# Patient Record
Sex: FEMALE | Race: WHITE | HISPANIC OR LATINO | Employment: FULL TIME | URBAN - METROPOLITAN AREA
[De-identification: names, ages, dates, MRNs, and addresses within clinical notes are randomized per-mention and may not be internally consistent; named-entity substitution may affect disease eponyms.]

---

## 2021-11-07 ENCOUNTER — OFFICE VISIT (OUTPATIENT)
Dept: URGENT CARE | Facility: URGENT CARE | Age: 41
End: 2021-11-07

## 2021-11-07 VITALS
DIASTOLIC BLOOD PRESSURE: 87 MMHG | TEMPERATURE: 98.2 F | SYSTOLIC BLOOD PRESSURE: 139 MMHG | OXYGEN SATURATION: 99 % | HEART RATE: 80 BPM

## 2021-11-07 DIAGNOSIS — R05.9 COUGH: ICD-10-CM

## 2021-11-07 DIAGNOSIS — Z20.822 EXPOSURE TO 2019 NOVEL CORONAVIRUS: ICD-10-CM

## 2021-11-07 DIAGNOSIS — R43.2 LOSS OF TASTE: Primary | ICD-10-CM

## 2021-11-07 PROCEDURE — 99203 OFFICE O/P NEW LOW 30 MIN: CPT | Performed by: PREVENTIVE MEDICINE

## 2021-11-07 PROCEDURE — U0005 INFEC AGEN DETEC AMPLI PROBE: HCPCS | Performed by: PREVENTIVE MEDICINE

## 2021-11-07 PROCEDURE — U0003 INFECTIOUS AGENT DETECTION BY NUCLEIC ACID (DNA OR RNA); SEVERE ACUTE RESPIRATORY SYNDROME CORONAVIRUS 2 (SARS-COV-2) (CORONAVIRUS DISEASE [COVID-19]), AMPLIFIED PROBE TECHNIQUE, MAKING USE OF HIGH THROUGHPUT TECHNOLOGIES AS DESCRIBED BY CMS-2020-01-R: HCPCS | Performed by: PREVENTIVE MEDICINE

## 2021-11-07 NOTE — PATIENT INSTRUCTIONS
Tylenol 500 mg three times per day  Follow up for evaluation if you develop shortness of breath, oxygen level <90% or symptoms don't improve over the next week.  I recommend getting a pulse oximeter at a pharmacy to check your oxygen level.  COVID result on my chart in 2-3 days.

## 2021-11-07 NOTE — LETTER
[facility] [place]  [address] [facility] [address]  Phone: [phone]    November 7, 2021        [place]  [address] [address]          To whom it may concern:    RE: Sarah Duque    Patient was seen and treated today at our clinic.  She had a positive rapid covid  test at home.  She is advised to be off work until 11/15/21 due to this illness.  We are confirming a positive covid test here in the clinic today.      Please contact me for questions or concerns.      Sincerely,        Tristan Villa MD

## 2021-11-07 NOTE — LETTER
[facility] [place]  [address] [facility] [address]  Phone: [phone]    November 7, 2021        [facility] Duque  [address] [address]          To whom it may concern:    RE: Sarah Duque    Patient was seen and treated today at our clinic.  She has covid and is advised to be off work until at least 11/15/21.  She should be feeling better and without fever when she returns to work.    Please contact me for questions or concerns.      Sincerely,        Tristan Villa MD

## 2021-11-07 NOTE — LETTER
November 9, 2021      Sarah BARRETT [place]  [address]   [facility] [address]          SARS CoV2 PCR (no units)   Date Value   11/07/2021 Positive (A)       This letter provides a written record that you were tested for COVID-19. Your result was positive. This means you have COVID-19 (coronavirus).    How can I protect others?If you have symptoms, stay home and away from others (self-isolate) until:You ve had no fever--and no medicine that reduces fever--for 1 full day (24 hours). And      Your other symptoms have gotten better. For example, your cough or breathing has improved. And     At least 10 days have passed since your symptoms started. (If you've been told by a doctor that you have a weak immune system, wait 20 days).    If you don t have symptoms: Stay home and away from others (self-isolate) until at least 10 days have passed since your first positive COVID-19 test. If you have a weak immune system, please self-isolate for 20 days.    During this time:    Stay in your own room, including for meals. Use your own bathroom if you can.    Stay away from others in your home. No hugging, kissing or shaking hands. No visitors.     Don t go to work, school or anywhere else.     Clean  high touch  surfaces often (doorknobs, counters, handles, etc.). Use a household cleaning spray or wipes. You ll find a full list on the EPA website at www.epa.gov/pesticide-registration/list-n-disinfectants-use-against-sars-cov-2.     Cover your mouth and nose with a mask or other face covering to avoid spreading germs.    Wash your hands and face often with soap and water.    Make a list of people you have been in close contact with recently, even if either of you wore a face covering.  o Start your list from 2 days before you became ill or had a positive test.  o Include anyone that was within 6 feet of you for a cumulative total of 15 minutes or more in 24 hours. (Example: if you sat next to Keo for 5 minutes in the morning and  10 minutes in the afternoon, then you were in close contact for 15 minutes total that day. Keo would be added to your list.)        A public health worker will call or text you. It is important that you answer. They will ask you questions about possible exposures to COVID-19, such as people you have been in direct contact with and places you have visited.    Tell the people on your list that you have COVID-19; they should stay away from others for 14 days starting form the last time they were in contact with you (unless you are told something different from a public health worker).    Caregivers in these groups are at risk for severe illness due to COVID-19:  o People 65 years and older  o People who live in a nursing home or long-term care facility  o People with chronic disease (lung, heart, cancer, diabetes, kidney, liver, immunologic)  o People who have a weakened immune system, including those who:  - Are in cancer treatment  - Take medicine that weakens the immune system, such as corticosteroids  - Had a bone marrow or organ transplant  - Have an immune deficiency  - Have poorly controlled HIV or AIDS  - Are obese (body mass index of 40 or higher)  - Smoke regularly    Caregivers should wear gloves while washing dishes, handling laundry and cleaning bedrooms and bathrooms.    Wash and dry laundry with special caution. Don t shake dirty laundry, and use the warmest water setting you can.    If you have a weakened immune system, ask your doctor about other actions you should take.    For more tips, go to www.cdc.gov/coronavirus/2019-ncov/downloads/10Things.pdf.    You should not go back to work until you meet the guidelines above for ending your home isolation. You don't need to be retested for COVID-19 before going back to work- studies show that you won't spread the virus if it's been at least 10 days since your symptoms started (or 20 days, if you have a weak immune system).    Employers: This document  serves as formal notice of your employee s medical guidelines for going back to work. They must meet the above guidelines before going back to work in person.    How can I take care of myself?    1. Get lots of rest. Drink extra fluids (unless a doctor has told you not to).    2. Take Tylenol (acetaminophen) for fever or pain. If you have liver or kidney problems, ask your family doctor if it s okay to take Tylenol.     Take either:     650 mg (two 325 mg pills) every 4 to 6 hours, or     1,000 mg (two 500 mg pills) every 8 hours as needed.     Note: Don t take more than 3,000 mg in one day. Acetaminophen is found in many medicines (both prescribed and over-the-counter medicines). Read all labels to be sure you don t take too much.    For children, check the Tylenol bottle for the right dose (based on their age or weight).    3. If you have other health problems (like cancer, heart failure, an organ transplant or severe kidney disease): Call your specialty clinic if you don t feel better in the next 2 days.    4. Know when to call 911: Emergency warning signs include:    Trouble breathing or shortness of breath    Pain or pressure in the chest that doesn t go away    Feeling confused like you haven t felt before, or not being able to wake up    Bluish-colored lips or face    5. Sign up for [employer]. We know it s scary to hear that you have COVID-19. We want to track your symptoms to make sure you re okay over the next 2 weeks. Please look for an email from [employer]--this is a free, online program that we ll use to keep in touch. To sign up, follow the link in the email. Learn more at [email or web address].      Where can I get more information?     [facility] [place]: [email or web address]    Coronavirus Basics: www.health.[facility].[place]/diseases/coronavirus/basics.html    What to Do If You re Sick: www.cdc.gov/coronavirus/2019-ncov/about/steps-when-sick.html    Ending Home Isolation:  www.cdc.gov/coronavirus/2019-ncov/hcp/disposition-in-home-patients.html     Caring for Someone with COVID-19: www.cdc.gov/coronavirus/2019-ncov/if-you-are-sick/care-for-someone.html     [facility] clinical trials (COVID-19 research studies): clinicalaffairs.[facility]-clinical-trials

## 2021-11-07 NOTE — PROGRESS NOTES
Assessment & Plan     1. Loss of taste  - Symptomatic COVID-19 Virus (Coronavirus) by PCR Nose    2. Cough    3. Exposure to 2019 novel coronavirus    Tylenol 500 mg three times per day  Follow up for evaluation if you develop shortness of breath, oxygen level <90% or symptoms don't improve over the next week.  I recommend getting a pulse oximeter at a pharmacy to check your oxygen level.  COVID result on my chart in 2-3 days.       31 minutes spent on the date of the encounter doing chart review, patient visit and documentation         No follow-ups on file.    Tristan Villa MD  [facility]    Subjective     Sarah Duque is a 40 year old year old female who presents to clinic today for the following health issues:    Patient presents with:  Nasal Congestion: nasal congestion and cough for the past few days. loss of taste this morning--needing work note    This is a 39 yo female with nasal congestion and cough (dry) for 2 days.  Loss of taste this morning.  No cp or sob.  Vaccinated for covid.  Sudafed, dayquil, nyquil, tylenol, ibuprofen used for symptoms.      Patient Active Problem List   Diagnosis     Endometriosis     Encounter for insertion or removal of intrauterine contraceptive device     CARDIOVASCULAR SCREENING; LDL GOAL LESS THAN 160     Contraception       Current Outpatient Medications   Medication     albuterol (PROAIR HFA) 108 (90 BASE) MCG/ACT inhaler     No current facility-administered medications for this visit.       Past Medical History:   Diagnosis Date     Endometriosis of other specified sites 01/04    ovarian endometriomata     Generalized osteoarthrosis, unspecified site        Social History   reports that she has never smoked. She has never used smokeless tobacco. She reports that she does not drink alcohol and does not use drugs.    Family History   Problem Relation Age of Onset     Gastrointestinal Disease Mother         ulcer disease     Arthritis  Maternal Grandmother      Cerebrovascular Disease Maternal Grandmother        Review of Systems  Constitutional, HEENT, cardiovascular, pulmonary, GI, , musculoskeletal, neuro, skin, endocrine and psych systems are negative, except as otherwise noted.      Objective    /87   Pulse 80   Temp 98.2  F (36.8  C) (Tympanic)   SpO2 99%   Physical Exam   GENERAL: healthy, alert and no distress  EYES: Eyes grossly normal to inspection, PERRL and conjunctivae and sclerae normal  HENT: ear canals and TM's normal, nose and mouth without ulcers or lesions  NECK: no adenopathy, no asymmetry, masses, or scars and thyroid normal to palpation  RESP: lungs clear to auscultation - no rales, rhonchi or wheezes  CV: regular rate and rhythm, normal S1 S2, no S3 or S4, no murmur, click or rub, no peripheral edema and peripheral pulses strong  ABDOMEN: soft, nontender, no hepatosplenomegaly, no masses and bowel sounds normal  MS: no gross musculoskeletal defects noted, no edema  SKIN: no suspicious lesions or rashes  NEURO: Normal strength and tone, mentation intact and speech normal  PSYCH: mentation appears normal, affect normal/bright

## 2021-11-08 ENCOUNTER — TELEPHONE (OUTPATIENT)
Dept: URGENT CARE | Facility: URGENT CARE | Age: 41
End: 2021-11-08

## 2021-11-08 LAB — SARS-COV-2 RNA RESP QL NAA+PROBE: POSITIVE

## 2021-11-08 NOTE — TELEPHONE ENCOUNTER
"-Coronavirus (COVID-19) Notification    Caller Name (Patient, parent, daughter/son, grandparent, etc)  Patient     Reason for call  Notify of Positive Coronavirus (COVID-19) lab results, assess symptoms,  review  [employer] recommendations    Lab Result    Lab test:  2019-nCoV rRt-PCR or SARS-CoV-2 PCR    Oropharyngeal AND/OR nasopharyngeal swabs is POSITIVE for 2019-nCoV RNA/SARS-COV-2 PCR (COVID-19 virus)    RN Recommendations/Instructions per [facility] Coronavirus COVID-19 recommendations    Brief introduction script  Introduce self then review script:  \"I am calling on behalf of [employer].  We were notified that your Coronavirus test (COVID-19) for was POSITIVE for the virus.  I have some information to relay to you but first I wanted to mention that the [place] Dept of Health will be contacting you shortly [it's possible MD already called Patient] to talk to you more about how you are feeling and other people you have had contact with who might now also have the virus.  Also,  [employer] is Partnering with the [facility] for Covid-19 research, you may be contacted directly by research staff.\"    Assessment (Inquire about Patient's current symptoms)   Assessment   Current Symptoms at time of phone call: (if no symptoms, document No symptoms] Nasal congestion and loss of taste and smell   Symptoms onset (if applicable) 1 day ago     If at time of call, Patients symptoms hare worsened, the Patient should contact 911 or have someone drive them to Emergency Dept promptly:      If Patient calling 911, inform 911 personal that you have tested positive for the Coronavirus (COVID-19).  Place mask on and await 911 to arrive.    If Emergency Dept, If possible, please have another adult drive you to the Emergency Dept but you need to wear mask when in contact with other people.      Monoclonal Antibody Administration    You may be eligible to receive a new treatment with a monoclonal antibody " "for preventing hospitalization in patients at high risk for complications from COVID-19.   This medication is still experimental and available on a limited basis; it is given through an IV and must be given at an infusion center. Please note that not all people who are eligible will receive the medication since it is in limited supply.     Are you interested in being considered for this medication?  No.   Does the patient fit the criteria: No    If patient qualifies based on above criteria:  \"You will be contacted if you are selected to receive this treatment in the next 1-2 business days.   This is time sensitive and if you are not selected in the next 1-2 business days, you will not receive the medication.  If you do not receive a call to schedule, you have not been selected.\"      Review information with Patient    Your result was positive. This means you have COVID-19 (coronavirus).  We have sent you a letter that reviews the information that I'll be reviewing with you now.    How can I protect others?    If you have symptoms: stay home and away from others (self-isolate) until:    You've had no fever--and no medicine that reduces fever--for 1 full day (24 hours). And       Your other symptoms have gotten better. For example, your cough or breathing has improved. And     At least 10 days have passed since your symptoms started. (If you've been told by a doctor that you have a weak immune system, wait 20 days.)     If you don't have symptoms: Stay home and away from others (self-isolate) until at least 10 days have passed since your first positive COVID-19 test. (Date test collected)    During this time:    Stay in your own room, including for meals. Use your own bathroom if you can.    Stay away from others in your home. No hugging, kissing or shaking hands. No visitors.     Don't go to work, school or anywhere else.     Clean  high touch  surfaces often (doorknobs, counters, handles, etc.). Use a household " cleaning spray or wipes. You'll find a full list on the EPA website at www.epa.gov/pesticide-registration/list-n-disinfectants-use-against-sars-cov-2.     Cover your mouth and nose with a mask, tissue or other face covering to avoid spreading germs.    Wash your hands and face often with soap and water.    Make a list of people you have been in close contact with recently, even if either of you wore a face covering.   ; Start your list from 2 days before you became ill or had a positive test.  ; Include anyone that was within 6 feet of you for a cumulative total of 15 minutes or more in 24 hours. (Example: if you sat next to Keo for 5 minutes in the morning and 10 minutes in the afternoon, then you were in close contact for 15 minutes total that day. Keo would be added to your list.)    A public health worker will call or text you. It is important that you answer. They will ask you questions about possible exposures to COVID-19, such as people you have been in direct contact with and places you have visited.    Tell the people on your list that you have COVID-19; they should stay away from others for 14 days starting from the last time they were in contact with you (unless you are told something different from a public health worker).     Caregivers in these groups are at risk for severe illness due to COVID-19:  o People 65 years and older  o People who live in a nursing home or long-term care facility  o People with chronic disease (lung, heart, cancer, diabetes, kidney, liver, immunologic)  o People who have a weakened immune system, including those who:  - Are in cancer treatment  - Take medicine that weakens the immune system, such as corticosteroids  - Had a bone marrow or organ transplant  - Have an immune deficiency  - Have poorly controlled HIV or AIDS  - Are obese (body mass index of 40 or higher)  - Smoke regularly    Caregivers should wear gloves while washing dishes, handling laundry and cleaning  bedrooms and bathrooms.    Wash and dry laundry with special caution. Don't shake dirty laundry, and use the warmest water setting you can.    If you have a weakened immune system, ask your doctor about other actions you should take.    For more tips, go to www.cdc.gov/coronavirus/2019-ncov/downloads/10Things.pdf.    You should not go back to work until you meet the guidelines above for ending your home isolation. You don't need to be retested for COVID-19 before going back to work--studies show that you won't spread the virus if it's been at least 10 days since your symptoms started (or 20 days, if you have a weak immune system).    Employers: This document serves as formal notice of your employee's medical guidelines for going back to work. They must meet the above guidelines before going back to work in person.    How can I take care of myself?    1. Get lots of rest. Drink extra fluids (unless a doctor has told you not to).    2. Take Tylenol (acetaminophen) for fever or pain. If you have liver or kidney problems, ask your family doctor if it's okay to take Tylenol.     Take either:     650 mg (two 325 mg pills) every 4 to 6 hours, or     1,000 mg (two 500 mg pills) every 8 hours as needed.     Note: Don't take more than 3,000 mg in one day. Acetaminophen is found in many medicines (both prescribed and over-the-counter medicines). Read all labels to be sure you don't take too much.    For children, check the Tylenol bottle for the right dose (based on their age or weight).    3. If you have other health problems (like cancer, heart failure, an organ transplant or severe kidney disease): Call your specialty clinic if you don't feel better in the next 2 days.    4. Know when to call 911: Emergency warning signs include:    Trouble breathing or shortness of breath    Pain or pressure in the chest that doesn't go away    Feeling confused like you haven't felt before, or not being able to wake up    Bluish-colored  lips or face    5. Sign up for [employer]. We know it's scary to hear that you have COVID-19. We want to track your symptoms to make sure you're okay over the next 2 weeks. Please look for an email from [employer]--this is a free, online program that we'll use to keep in touch. To sign up, follow the link in the email. Learn more at [email or web address].    Where can I get more information?    [facility]: [email or web address]    Coronavirus Basics: www.health.[facility].mn./diseases/coronavirus/basics.html    What to Do If You're Sick: www.cdc.gov/coronavirus/2019-ncov/about/steps-when-sick.html    Ending Home Isolation: www.cdc.gov/coronavirus/2019-ncov/hcp/disposition-in-home-patients.html     Caring for Someone with COVID-19: www.cdc.gov/coronavirus/2019-ncov/if-you-are-sick/care-for-someone.html     [facility] clinical trials (COVID-19 research studies): clinicalaffairs.[facility]-clinical-trials     A Positive COVID-19 letter will be sent via [employer] or the mail. (Exception, no letters sent to Presurgerical/Preprocedure Patients)    Mariann Gupta LPN

## 2024-06-03 ENCOUNTER — OFFICE VISIT (OUTPATIENT)
Dept: URGENT CARE | Facility: URGENT CARE | Age: 44
End: 2024-06-03

## 2024-06-03 VITALS
DIASTOLIC BLOOD PRESSURE: 87 MMHG | SYSTOLIC BLOOD PRESSURE: 155 MMHG | BODY MASS INDEX: 31.29 KG/M2 | OXYGEN SATURATION: 99 % | TEMPERATURE: 97.4 F | RESPIRATION RATE: 16 BRPM | WEIGHT: 199.8 LBS | HEART RATE: 78 BPM

## 2024-06-03 DIAGNOSIS — J40 BRONCHITIS: Primary | ICD-10-CM

## 2024-06-03 PROCEDURE — 99213 OFFICE O/P EST LOW 20 MIN: CPT | Performed by: FAMILY MEDICINE

## 2024-06-03 RX ORDER — PREDNISONE 20 MG/1
TABLET ORAL
Qty: 12 TABLET | Refills: 0 | Status: SHIPPED | OUTPATIENT
Start: 2024-06-03 | End: 2024-06-14

## 2024-06-03 RX ORDER — AZITHROMYCIN 250 MG/1
TABLET, FILM COATED ORAL
Qty: 6 TABLET | Refills: 0 | Status: SHIPPED | OUTPATIENT
Start: 2024-06-03 | End: 2024-06-08

## 2024-06-03 NOTE — PROGRESS NOTES
(J40) Bronchitis  (primary encounter diagnosis)  Comment:   Plan: azithromycin (ZITHROMAX) 250 MG tablet,         predniSONE (DELTASONE) 20 MG tablet            CHIEF COMPLAINT    Persistent cough.      HISTORY    This patient is a 43-year-old woman who has had a persistent cough with minimal sputum production.  Duration has been about 4 weeks.  She has not noticed fever.    She has no history of smoking or vaping.  No history of asthma.      REVIEW OF SYSTEMS    No sore throat or ear pain.  No chest pain.  No abdominal pain or nausea.      EXAM  BP (!) 155/87   Pulse 78   Temp 97.4  F (36.3  C) (Oral)   Resp 16   Wt 90.6 kg (199 lb 12.8 oz)   LMP 10/17/2011   SpO2 99%   BMI 31.29 kg/m      TMs normal.  Pharynx without inflammation or swelling.  No significant cervical adenopathy.  Lungs are clear.    Left leg is about 2 cm greater in circumference than right.  Has had no calf tenderness.  Has noticed the difference in size for about a month.  She does have some problems with her knees, possible DJD.  I did suggest a primary care appointment at which time this can be checked out further.

## 2024-06-14 ENCOUNTER — OFFICE VISIT (OUTPATIENT)
Dept: FAMILY MEDICINE | Facility: CLINIC | Age: 44
End: 2024-06-14

## 2024-06-14 ENCOUNTER — ANCILLARY PROCEDURE (OUTPATIENT)
Dept: GENERAL RADIOLOGY | Facility: CLINIC | Age: 44
End: 2024-06-14
Attending: PHYSICIAN ASSISTANT

## 2024-06-14 ENCOUNTER — TELEPHONE (OUTPATIENT)
Dept: FAMILY MEDICINE | Facility: CLINIC | Age: 44
End: 2024-06-14

## 2024-06-14 VITALS
HEART RATE: 83 BPM | OXYGEN SATURATION: 98 % | WEIGHT: 199 LBS | DIASTOLIC BLOOD PRESSURE: 88 MMHG | HEIGHT: 67 IN | TEMPERATURE: 98 F | RESPIRATION RATE: 16 BRPM | SYSTOLIC BLOOD PRESSURE: 128 MMHG | BODY MASS INDEX: 31.23 KG/M2

## 2024-06-14 DIAGNOSIS — M25.562 CHRONIC PAIN OF BOTH KNEES: ICD-10-CM

## 2024-06-14 DIAGNOSIS — R05.2 SUBACUTE COUGH: ICD-10-CM

## 2024-06-14 DIAGNOSIS — M79.89 LEFT LEG SWELLING: Primary | ICD-10-CM

## 2024-06-14 DIAGNOSIS — G89.29 CHRONIC PAIN OF BOTH KNEES: ICD-10-CM

## 2024-06-14 DIAGNOSIS — R06.02 SOB (SHORTNESS OF BREATH): ICD-10-CM

## 2024-06-14 DIAGNOSIS — M25.561 CHRONIC PAIN OF BOTH KNEES: ICD-10-CM

## 2024-06-14 LAB
ALBUMIN SERPL BCG-MCNC: 4.3 G/DL (ref 3.5–5.2)
ALP SERPL-CCNC: 102 U/L (ref 40–150)
ALT SERPL W P-5'-P-CCNC: 13 U/L (ref 0–50)
ANION GAP SERPL CALCULATED.3IONS-SCNC: 6 MMOL/L (ref 7–15)
AST SERPL W P-5'-P-CCNC: 17 U/L (ref 0–45)
BILIRUB SERPL-MCNC: 0.2 MG/DL
BUN SERPL-MCNC: 18.3 MG/DL (ref 6–20)
CALCIUM SERPL-MCNC: 10 MG/DL (ref 8.6–10)
CHLORIDE SERPL-SCNC: 105 MMOL/L (ref 98–107)
CREAT SERPL-MCNC: 0.72 MG/DL (ref 0.51–0.95)
D DIMER PPP FEU-MCNC: <0.27 UG/ML FEU (ref 0–0.5)
DEPRECATED HCO3 PLAS-SCNC: 29 MMOL/L (ref 22–29)
EGFRCR SERPLBLD CKD-EPI 2021: >90 ML/MIN/1.73M2
GLUCOSE SERPL-MCNC: 99 MG/DL (ref 70–99)
NT-PROBNP SERPL-MCNC: <36 PG/ML (ref 0–450)
POTASSIUM SERPL-SCNC: 4.6 MMOL/L (ref 3.4–5.3)
PROT SERPL-MCNC: 7.4 G/DL (ref 6.4–8.3)
SODIUM SERPL-SCNC: 140 MMOL/L (ref 135–145)

## 2024-06-14 PROCEDURE — 71046 X-RAY EXAM CHEST 2 VIEWS: CPT | Mod: TC | Performed by: RADIOLOGY

## 2024-06-14 PROCEDURE — 36415 COLL VENOUS BLD VENIPUNCTURE: CPT | Performed by: PHYSICIAN ASSISTANT

## 2024-06-14 PROCEDURE — 80053 COMPREHEN METABOLIC PANEL: CPT | Performed by: PHYSICIAN ASSISTANT

## 2024-06-14 PROCEDURE — 99204 OFFICE O/P NEW MOD 45 MIN: CPT | Performed by: PHYSICIAN ASSISTANT

## 2024-06-14 PROCEDURE — 83880 ASSAY OF NATRIURETIC PEPTIDE: CPT | Performed by: PHYSICIAN ASSISTANT

## 2024-06-14 PROCEDURE — 73562 X-RAY EXAM OF KNEE 3: CPT | Mod: TC | Performed by: RADIOLOGY

## 2024-06-14 PROCEDURE — 85379 FIBRIN DEGRADATION QUANT: CPT | Performed by: PHYSICIAN ASSISTANT

## 2024-06-14 RX ORDER — CITALOPRAM HYDROBROMIDE 20 MG/1
1 TABLET ORAL DAILY
COMMUNITY
Start: 2024-01-30 | End: 2024-06-14

## 2024-06-14 RX ORDER — PROGESTERONE 100 MG/1
100 CAPSULE ORAL DAILY
COMMUNITY
Start: 2024-01-30 | End: 2024-06-14

## 2024-06-14 ASSESSMENT — PAIN SCALES - GENERAL: PAINLEVEL: MODERATE PAIN (4)
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
## 2024-06-14 NOTE — TELEPHONE ENCOUNTER
Called patient and explained we had orders for xrays and to return to clinic. Felipa Mann CMA on 6/14/2024 at 1:58 PM

## 2024-06-15 ENCOUNTER — TELEPHONE (OUTPATIENT)
Dept: FAMILY MEDICINE | Facility: CLINIC | Age: 44
End: 2024-06-15

## 2024-06-16 RX ORDER — BENZONATATE 200 MG/1
200 CAPSULE ORAL 3 TIMES DAILY PRN
Qty: 15 CAPSULE | Refills: 0 | Status: SHIPPED | OUTPATIENT
Start: 2024-06-16

## 2024-06-17 NOTE — TELEPHONE ENCOUNTER
I called patient. Her voicemail was identified by her. I left a detailed message regarding results. I apologized for her not getting a call as results were not available before clinic closed on Friday. It appears that she has seen the two [employer] messages I left for her. I reiterated the messages left in [employer] via her voicemail.

## 2024-08-24 ENCOUNTER — HEALTH MAINTENANCE LETTER (OUTPATIENT)
Age: 44
End: 2024-08-24

## 2025-01-08 ENCOUNTER — OFFICE VISIT (OUTPATIENT)
Dept: FAMILY MEDICINE | Facility: CLINIC | Age: 45
End: 2025-01-08

## 2025-01-08 ENCOUNTER — ANCILLARY PROCEDURE (OUTPATIENT)
Dept: GENERAL RADIOLOGY | Facility: CLINIC | Age: 45
End: 2025-01-08
Attending: PHYSICIAN ASSISTANT

## 2025-01-08 VITALS
BODY MASS INDEX: 31.79 KG/M2 | OXYGEN SATURATION: 98 % | DIASTOLIC BLOOD PRESSURE: 82 MMHG | SYSTOLIC BLOOD PRESSURE: 134 MMHG | WEIGHT: 197.8 LBS | RESPIRATION RATE: 16 BRPM | TEMPERATURE: 97.6 F | HEART RATE: 101 BPM | HEIGHT: 66 IN

## 2025-01-08 DIAGNOSIS — M54.16 LUMBAR RADICULOPATHY: ICD-10-CM

## 2025-01-08 DIAGNOSIS — M54.16 LUMBAR RADICULOPATHY: Primary | ICD-10-CM

## 2025-01-08 PROCEDURE — 72100 X-RAY EXAM L-S SPINE 2/3 VWS: CPT | Mod: TC | Performed by: RADIOLOGY

## 2025-01-08 PROCEDURE — 99213 OFFICE O/P EST LOW 20 MIN: CPT | Performed by: PHYSICIAN ASSISTANT

## 2025-01-08 RX ORDER — METHYLPREDNISOLONE 4 MG/1
4 TABLET ORAL 2 TIMES DAILY
COMMUNITY
Start: 2025-01-03

## 2025-01-08 ASSESSMENT — PATIENT HEALTH QUESTIONNAIRE - PHQ9
10. IF YOU CHECKED OFF ANY PROBLEMS, HOW DIFFICULT HAVE THESE PROBLEMS MADE IT FOR YOU TO DO YOUR WORK, TAKE CARE OF THINGS AT HOME, OR GET ALONG WITH OTHER PEOPLE: VERY DIFFICULT
SUM OF ALL RESPONSES TO PHQ QUESTIONS 1-9: 13
SUM OF ALL RESPONSES TO PHQ QUESTIONS 1-9: 13

## 2025-01-08 ASSESSMENT — PAIN SCALES - GENERAL: PAINLEVEL_OUTOF10: WORST PAIN (10)

## 2025-01-08 ASSESSMENT — ENCOUNTER SYMPTOMS: BACK PAIN: 1

## 2025-01-08 NOTE — PROGRESS NOTES
Assessment & Plan     (M54.16) Lumbar radiculopathy  (primary encounter diagnosis)  Comment: Patient presents for evaluation of lumbar radiculopathy.  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  Evening had exacerbation of back pain and sought emergency evaluation on 1/3/2025.  Patient was provided with Medrol Dosepak as well as tizanidine muscle relaxers.  Has been using muscle relaxer sparingly.  Has not used any lidocaine patches.  Denies any loss of bowel or bladder control.  No saddle anesthesia.  Tenderness diffusely over the lumbar distribution with some midline tenderness with more right paraspinal tenderness.  Has radiculopathy symptoms down the left leg.  Patellar reflex 2+.  No evidence of cauda equina on examination.  Discussed with patient x-ray of lumbar spine, physical therapy, spine referral.  Reviewed red flags to seek emergent evaluation.  Work note provided for the next 2 to 3 weeks.  Discussed with patient expected course of improvement  Plan: Physical Therapy  Referral, Spine          Referral, XR Lumbar Spine 2/3 Views,         tiZANidine (ZANAFLEX) 4 MG tablet       Franco Smith is a 44 year old, presenting for the following health issues:  Back Pain (Lower back pain(left side))      1/8/2025     7:11 AM   Additional Questions   Roomed by ACACIA TAPIA   Accompanied by SELF     Back Pain     History of Present Illness       Back Pain:  She presents for follow up of back pain. Patient's back pain is a new problem.    Original cause of back pain: turning/bending  First noticed back pain: in the last week  Patient feels back pain: constantlyLocation of back pain:  Left lower back, right middle of back, left side of neck, left shoulder, left buttock and left hip  Description of back pain: burning, fullness, sharp, shooting and stabbing  Back pain spreads: left buttocks, left thigh and left  "foot    Since patient first noticed back pain, pain is: rapidly worsening  Does back pain interfere with her job:  Yes  On a scale of 1-10 (10 being the worst), patient describes pain as:  10  What makes back pain worse: bending, certain positions, lying down, sitting, standing and twisting   Acupuncture: not tried  Acetaminophen: not helpful  Activity or exercise: not tried  Chiropractor:  Not tried  Cold: not helpful  Heat: helpful  Massage: helpful  Muscle relaxants: helpful  NSAIDS: not helpful  Opioids: not tried  Physical Therapy: not tried  Rest: helpful  Steroid Injection: not tried  Stretching: helpful  Surgery: not tried  TENS unit: not tried  Topical pain relievers: not tried  Other healthcare providers patient is seeing for back pain: None   She is taking medications regularly.     Thursday left lower back while leaving work. Pain radiates to below the knee. 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 No loss of bowel or bladder control. NO saddle anesthesia. No prior surgeries to the back. Completed medrol dosepak. Burning sensation down the leg. Stabbing sensation in the lower back. No urinary symptoms.       Review of Systems  Constitutional, HEENT, cardiovascular, pulmonary, gi and gu systems are negative, except as otherwise noted.      Objective    /82   Pulse 101   Temp 97.6  F (36.4  C) (Tympanic)   Resp 16   Ht 1.676 m (5' 6\")   Wt 89.7 kg (197 lb 12.8 oz)   LMP 10/17/2011   SpO2 98%   BMI 31.93 kg/m    Body mass index is 31.93 kg/m .  Physical Exam   GENERAL: alert and no distress  RESP: lungs clear to auscultation - no rales, rhonchi or wheezes  CV: regular rate and rhythm, normal S1 S2, no S3 or S4, no murmur, click or rub, no peripheral edema  MS: Diffuse lumbar tenderness.  Slight midline tenderness with more prominent tenderness over the right lumbar paraspinal musculature.  No overlying erythema.  Negative modified straight leg " raise.  Normal sensation over saddle distribution.  Patellar reflex 2+ bilaterally.  5/5 knee extension/flexion.  NEURO: Normal strength and tone, mentation intact and speech normal  PSYCH: mentation appears normal, affect normal/bright            Signed Electronically by: Errol Araya PA-C

## 2025-01-08 NOTE — PATIENT INSTRUCTIONS
Ibuprofen 600 mg every 8 hours no more than 3200 mg per day.     Tylenol 1000 mg every 8 hours. No more than 4000 mg per day.

## 2025-01-08 NOTE — LETTER
January 8, 2025      Sarah Hooker  [address] [facility] [address]        To Whom It May Concern:    Sarah Hooker was seen in our clinic. She may return to work with the following: limited to light duty - lifting no greater than 10 pounds, no bending/stooping, no climbing, no repetitive motions, standing limited to 1 hrs, and walking limited to 1 hrs. Valid until 1/22/2025.    Sincerely,    Errol Araya      Electronically signed

## 2025-01-09 NOTE — CONFIDENTIAL NOTE
NEUROSURGERY - NEW PREVISIT PLANNING    Referring Provider: Errol Araya PA-C    OVN 1/8/2025   Reason For Visit: M54.16 (ICD-10-CM) - Lumbar radiculopathy        IMAGING STATUS/LOCATION DATE/TYPE   MRI N/A    CT N/A    XRAY PACS 01/08/2025  Lumbar  [facility]   NOTES STATUS/LOCATION DATE/TYPE   Other specialist OVN: N/A    EMG N/A    INJECTION N/A    PHYSICAL THERAPY N/A    SURGERY N/A

## 2025-01-10 PROBLEM — M54.16 LUMBAR RADICULOPATHY: Status: ACTIVE | Noted: 2025-01-10

## 2025-01-17 ENCOUNTER — PRE VISIT (OUTPATIENT)
Dept: NEUROSURGERY | Facility: CLINIC | Age: 45
End: 2025-01-17

## 2025-01-21 ENCOUNTER — THERAPY VISIT (OUTPATIENT)
Dept: PHYSICAL THERAPY | Facility: CLINIC | Age: 45
End: 2025-01-21
Attending: PHYSICIAN ASSISTANT
Payer: OTHER MISCELLANEOUS

## 2025-01-21 DIAGNOSIS — M54.16 LUMBAR RADICULOPATHY: Primary | ICD-10-CM

## 2025-01-21 PROCEDURE — 97110 THERAPEUTIC EXERCISES: CPT | Mod: GP | Performed by: PHYSICAL THERAPIST

## 2025-01-21 PROCEDURE — 97140 MANUAL THERAPY 1/> REGIONS: CPT | Mod: GP | Performed by: PHYSICAL THERAPIST

## 2025-02-17 ENCOUNTER — THERAPY VISIT (OUTPATIENT)
Dept: PHYSICAL THERAPY | Facility: CLINIC | Age: 45
End: 2025-02-17
Payer: OTHER MISCELLANEOUS

## 2025-02-17 DIAGNOSIS — M54.16 LUMBAR RADICULOPATHY: Primary | ICD-10-CM

## 2025-02-17 PROCEDURE — 97139 UNLISTED THERAPEUTIC PX: CPT | Mod: GP | Performed by: PHYSICAL THERAPIST

## 2025-02-17 PROCEDURE — 97140 MANUAL THERAPY 1/> REGIONS: CPT | Mod: GP | Performed by: PHYSICAL THERAPIST

## 2025-02-17 PROCEDURE — 97110 THERAPEUTIC EXERCISES: CPT | Mod: GP | Performed by: PHYSICAL THERAPIST

## 2025-02-24 ENCOUNTER — TELEPHONE (OUTPATIENT)
Dept: FAMILY MEDICINE | Facility: CLINIC | Age: 45
End: 2025-02-24

## 2025-02-24 ENCOUNTER — THERAPY VISIT (OUTPATIENT)
Dept: PHYSICAL THERAPY | Facility: CLINIC | Age: 45
End: 2025-02-24
Payer: OTHER MISCELLANEOUS

## 2025-02-24 DIAGNOSIS — M54.16 LUMBAR RADICULOPATHY: Primary | ICD-10-CM

## 2025-02-24 PROCEDURE — 97140 MANUAL THERAPY 1/> REGIONS: CPT | Mod: GP | Performed by: PHYSICAL THERAPIST

## 2025-02-24 PROCEDURE — 97139 UNLISTED THERAPEUTIC PX: CPT | Mod: GP | Performed by: PHYSICAL THERAPIST

## 2025-02-24 PROCEDURE — 97110 THERAPEUTIC EXERCISES: CPT | Mod: GP | Performed by: PHYSICAL THERAPIST
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
## 2025-03-03 ENCOUNTER — THERAPY VISIT (OUTPATIENT)
Dept: PHYSICAL THERAPY | Facility: CLINIC | Age: 45
End: 2025-03-03
Payer: OTHER MISCELLANEOUS

## 2025-03-03 DIAGNOSIS — M54.16 LUMBAR RADICULOPATHY: Primary | ICD-10-CM

## 2025-03-03 PROCEDURE — 97139 UNLISTED THERAPEUTIC PX: CPT | Mod: GP | Performed by: PHYSICAL THERAPIST

## 2025-03-03 PROCEDURE — 97110 THERAPEUTIC EXERCISES: CPT | Mod: GP | Performed by: PHYSICAL THERAPIST

## 2025-03-03 PROCEDURE — 97530 THERAPEUTIC ACTIVITIES: CPT | Mod: GP | Performed by: PHYSICAL THERAPIST

## 2025-03-03 PROCEDURE — 97140 MANUAL THERAPY 1/> REGIONS: CPT | Mod: GP | Performed by: PHYSICAL THERAPIST

## 2025-03-31 ENCOUNTER — TELEPHONE (OUTPATIENT)
Dept: FAMILY MEDICINE | Facility: CLINIC | Age: 45
End: 2025-03-31

## 2025-03-31 ENCOUNTER — THERAPY VISIT (OUTPATIENT)
Dept: PHYSICAL THERAPY | Facility: CLINIC | Age: 45
End: 2025-03-31
Payer: OTHER MISCELLANEOUS

## 2025-03-31 DIAGNOSIS — M54.16 LUMBAR RADICULOPATHY: Primary | ICD-10-CM

## 2025-03-31 PROCEDURE — 97139 UNLISTED THERAPEUTIC PX: CPT | Mod: GP | Performed by: PHYSICAL THERAPIST

## 2025-03-31 PROCEDURE — 97110 THERAPEUTIC EXERCISES: CPT | Mod: GP | Performed by: PHYSICAL THERAPIST

## 2025-03-31 PROCEDURE — 97140 MANUAL THERAPY 1/> REGIONS: CPT | Mod: GP | Performed by: PHYSICAL THERAPIST

## 2025-03-31 NOTE — TELEPHONE ENCOUNTER
Patient Quality Outreach    Patient is due for the following:   Breast Cancer Screening - Mammogram  Physical Preventive Adult Physical      Topic Date Due    Hepatitis B Vaccine (1 of 3 - 19+ 3-dose series) Never done    Diptheria Tetanus Pertussis (DTAP/TDAP/TD) Vaccine (4 - Td or Tdap) 07/23/2022    Flu Vaccine (1) 09/01/2024    COVID-19 Vaccine (3 - 2024-25 season) 09/01/2024       Action(s) Taken:   Schedule a Adult Preventative    Type of outreach:    Sent [employer] message.    Questions for provider review:             Valdez Pike MA  Chart routed to .

## 2025-04-08 ENCOUNTER — MYC REFILL (OUTPATIENT)
Dept: FAMILY MEDICINE | Facility: CLINIC | Age: 45
End: 2025-04-08
Payer: OTHER MISCELLANEOUS

## 2025-04-08 ENCOUNTER — MYC MEDICAL ADVICE (OUTPATIENT)
Dept: NEUROSURGERY | Facility: CLINIC | Age: 45
End: 2025-04-08
Payer: OTHER MISCELLANEOUS

## 2025-04-08 DIAGNOSIS — M54.16 LUMBAR RADICULOPATHY: ICD-10-CM

## 2025-04-08 DIAGNOSIS — F40.240 CLAUSTROPHOBIA: ICD-10-CM

## 2025-04-08 DIAGNOSIS — M54.16 LUMBAR RADICULOPATHY: Primary | ICD-10-CM

## 2025-04-08 RX ORDER — DIAZEPAM 5 MG/1
5 TABLET ORAL SEE ADMIN INSTRUCTIONS
Qty: 2 TABLET | Refills: 0 | Status: SHIPPED | OUTPATIENT
Start: 2025-04-08

## 2025-04-08 NOTE — TELEPHONE ENCOUNTER
"Patient sent MyChart reporting continued symptoms despite PT and would like to proceed with next steps.     Per Radha Vu NP on 1/17/25 \"-Continue physical therapy as previously recommended.   -Contact my office in 1 month if your symptoms persist or worsen\".     Will review with provider for next steps.   "

## 2025-04-08 NOTE — TELEPHONE ENCOUNTER
Patient requesting oral sedation for MRI as recommended by Radha Vu, NP. Oral valium pended, reviewed administration instructions via [employer].     Medication pended and routed to provider for sign off.

## 2025-04-08 NOTE — TELEPHONE ENCOUNTER
Radha Vu NP recommends   Lumbar MRI and follow up in clinic to discuss results.     Order placed. Updated patient via [employer] and routed to scheduling for coordination.

## 2025-04-28 ENCOUNTER — ANCILLARY PROCEDURE (OUTPATIENT)
Dept: MRI IMAGING | Facility: CLINIC | Age: 45
End: 2025-04-28
Attending: NURSE PRACTITIONER
Payer: OTHER MISCELLANEOUS

## 2025-04-28 DIAGNOSIS — M54.16 LUMBAR RADICULOPATHY: ICD-10-CM

## 2025-04-28 PROCEDURE — 72148 MRI LUMBAR SPINE W/O DYE: CPT | Mod: TC | Performed by: RADIOLOGY

## 2025-05-01 ENCOUNTER — OFFICE VISIT (OUTPATIENT)
Dept: NEUROSURGERY | Facility: CLINIC | Age: 45
End: 2025-05-01
Payer: OTHER MISCELLANEOUS

## 2025-05-01 VITALS
RESPIRATION RATE: 16 BRPM | OXYGEN SATURATION: 97 % | HEART RATE: 112 BPM | WEIGHT: 210 LBS | BODY MASS INDEX: 33.89 KG/M2 | DIASTOLIC BLOOD PRESSURE: 88 MMHG | SYSTOLIC BLOOD PRESSURE: 148 MMHG | TEMPERATURE: 97.1 F

## 2025-05-01 DIAGNOSIS — M54.16 LUMBAR RADICULOPATHY: Primary | ICD-10-CM

## 2025-05-01 ASSESSMENT — PAIN SCALES - GENERAL: PAINLEVEL_OUTOF10: MODERATE PAIN (6)

## 2025-05-01 NOTE — NURSING NOTE
"Sarah Hooker is a 44 year old female who presents for:  Chief Complaint   Patient presents with    Neurologic Problem     MRI Results         Initial Vitals:  BP (!) 148/88   Pulse 112   Temp 97.1  F (36.2  C) (Temporal)   Resp 16   Wt 210 lb (95.3 kg)   LMP 10/17/2011   SpO2 97%   BMI 33.89 kg/m   Estimated body mass index is 33.89 kg/m  as calculated from the following:    Height as of 1/8/25: 5' 6\" (1.676 m).    Weight as of this encounter: 210 lb (95.3 kg).. Body surface area is 2.11 meters squared. BP completed using cuff size: regular  Moderate Pain (6)    Nursing Comments:     Suzette Almeidaught    "

## 2025-05-01 NOTE — PROGRESS NOTES
[facility] Neurosurgery  Neurosurgery Follow Up:    HPI: Sarah Hooker is a 44 year old female with a work related injury 1/3/2025. She was lifting at work when she begun experiencing the pain. She describes midline low back pain which intermittently radiates to the buttock and left posterior thigh to the knee. Some intermittent paresthesias as well. No overt weakness. No bowel/bladder complaints or foot drop. Has begun PT and tried medication management with continuation of symptoms.   5/1/2025: Presents for a follow up to review lumbar MRI results. Continues to report left>right back and thigh pain as above.    Medical, surgical, family, and social history unchanged since prior exam.  Exam:  Constitutional:  Alert, well nourished, NAD.  HEENT: Normocephalic, atraumatic.   Pulm:  Without shortness of breath   CV:  No pitting edema of BLE.      Vital Signs:  BP (!) 148/88   Pulse 112   Temp 97.1  F (36.2  C) (Temporal)   Resp 16   Wt 210 lb (95.3 kg)   LMP 10/17/2011   SpO2 97%   BMI 33.89 kg/m      Neurological:  Awake  Alert  Oriented x 3  Motor exam:        IP Q DF PF EHL  R   5  5   5   5    5  L   5  5   5   5    5     Able to spontaneously move L/E bilaterally  Sensation intact throughout all L/E dermatomes     Imaging:   Narrative & Impression   EXAM: MR LUMBAR SPINE W/O CONTRAST  LOCATION: [facility]  DATE: 4/28/2025     INDICATION: Lumbar radiculopathy.  COMPARISON: Lumbar spine radiographs dated 1/8/2025. CT abdomen and pelvis 6/16/2023.  TECHNIQUE: Routine Lumbar Spine MRI without IV contrast.     FINDINGS: Transitional lumbosacral anatomy. For the purposes of this report, I will presume that there are 5 lumbar vertebral bodies with a partially lumbarized S1 vertebra and a mildly prominent S1-S2 intervertebral disc space. Careful radiographic   correlation recommended prior to any planned spinal intervention.     Multilevel Schmorl's nodes/degenerative endplate  irregularities. Vertebral body heights otherwise appear within normal limits. Retrolisthesis of L5 on S1 measures approximately 2-3 millimeters. Sagittal alignment otherwise normal. Mild patchy presumed   Modic type I degenerative endplate signal change at L5-S1. T2 and predominantly T1 hyperintense or isointense lesion in the left aspect of the L4 vertebral body measuring approximately 23 mm (series 104 image 13), which may represent an intraosseous   hemangioma. Marrow signal otherwise grossly unremarkable. Multilevel disc desiccation, most pronounced at L2-3 and L5-S1. Normal appearance of the distal spinal cord with the conus terminating at the L1 inferior endplate level. The visualized paraspinous   soft tissues and bony pelvis appear unremarkable.     Segmental analysis:  T12-L1: Normal disc height. No herniation. Normal facets. No spinal canal or neural foraminal stenosis.     L1-L2: Normal disc height. No herniation. Normal facets. No spinal canal or neural foraminal stenosis     L2-L3: Normal disc height. No herniation. Normal facets. No spinal canal or neural foraminal stenosis.     L3-L4: Normal disc height. No herniation. Normal facets. No spinal canal or neural foraminal stenosis.     L4-L5: Normal disc height. No herniation. Normal facets. No spinal canal stenosis. Minimal left neural foraminal narrowing. The right neural foramen appears patent.     L5-S1: Mild to moderate disc height loss. Disc bulge eccentric to the left with superimposed left central disc protrusion (series 104 image 10, series 109 image 47). Mild facet arthropathy. There is asymmetric mass effect upon and posterior displacement   of the traversing left S1 nerve root by the disc protrusion. There is up to moderate left lateral recess narrowing and mild overall central spinal canal narrowing. There is moderate left and mild to moderate right neural foraminal stenosis.     S1-S2: No spinal canal or neural foraminal stenosis evident.                                                                       IMPRESSION:  1.  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  2.  At L5-S1, there is a disc bulge eccentric to the left with superimposed left central disc protrusion. This results in asymmetric mass effect upon and posterior displacement of the traversing left S1 nerve root. Correlate clinically for possible left   S1 radiculopathy symptoms. There is up to moderate left lateral recess stenosis and mild overall central spinal canal stenosis at that level. Moderate left and mild to moderate right L5-S1 neural foraminal stenosis.  3.  No significant spinal canal or neural foraminal stenosis elsewhere.     A/P: lumbar radiculopathy  Reviewed lumbar MRI in clinic. Will proceed with LESI at this time. Ok to continue home therapy exercises. She verbalized understanding and agreement.  Patient Instructions   -Lumbar injection ordered. They will contact you to schedule.  -Please contact our clinic with questions or concerns at [phone].    Radha Vu [facility] Neurosurgery  [address] [place] [address]  Tel [phone]  Fax [phone]

## 2025-05-01 NOTE — PATIENT INSTRUCTIONS
-Lumbar injection ordered. They will contact you to schedule.  -Please contact our clinic with questions or concerns at [phone].

## 2025-05-01 NOTE — LETTER
5/1/2025      Sarah Hooker  [address] [facility] [address]      Dear Colleague,    Thank you for referring your patient, Sarah Hooker, to the [facility] [place]. Please see a copy of my visit note below.    [facility] Neurosurgery  Neurosurgery Follow Up:    HPI: Sarah Hooker is a 44 year old female with a work related injury 1/3/2025. She was lifting at work when she begun experiencing the pain. She describes midline low back pain which intermittently radiates to the buttock and left posterior thigh to the knee. Some intermittent paresthesias as well. No overt weakness. No bowel/bladder complaints or foot drop. Has begun PT and tried medication management with continuation of symptoms.   5/1/2025: Presents for a follow up to review lumbar MRI results. Continues to report left>right back and thigh pain as above.    Medical, surgical, family, and social history unchanged since prior exam.  Exam:  Constitutional:  Alert, well nourished, NAD.  HEENT: Normocephalic, atraumatic.   Pulm:  Without shortness of breath   CV:  No pitting edema of BLE.      Vital Signs:  BP (!) 148/88   Pulse 112   Temp 97.1  F (36.2  C) (Temporal)   Resp 16   Wt 210 lb (95.3 kg)   LMP 10/17/2011   SpO2 97%   BMI 33.89 kg/m      Neurological:  Awake  Alert  Oriented x 3  Motor exam:        IP Q DF PF EHL  R   5  5   5   5    5  L   5  5   5   5    5     Able to spontaneously move L/E bilaterally  Sensation intact throughout all L/E dermatomes     Imaging:   Narrative & Impression   EXAM: MR LUMBAR SPINE W/O CONTRAST  LOCATION: [facility]  DATE: 4/28/2025     INDICATION: Lumbar radiculopathy.  COMPARISON: Lumbar spine radiographs dated 1/8/2025. CT abdomen and pelvis 6/16/2023.  TECHNIQUE: Routine Lumbar Spine MRI without IV contrast.     FINDINGS: Transitional lumbosacral anatomy. For the purposes of this report, I will presume that there are 5 lumbar vertebral bodies  with a partially lumbarized S1 vertebra and a mildly prominent S1-S2 intervertebral disc space. Careful radiographic   correlation recommended prior to any planned spinal intervention.     Multilevel Schmorl's nodes/degenerative endplate irregularities. Vertebral body heights otherwise appear within normal limits. Retrolisthesis of L5 on S1 measures approximately 2-3 millimeters. Sagittal alignment otherwise normal. Mild patchy presumed   Modic type I degenerative endplate signal change at L5-S1. T2 and predominantly T1 hyperintense or isointense lesion in the left aspect of the L4 vertebral body measuring approximately 23 mm (series 104 image 13), which may represent an intraosseous   hemangioma. Marrow signal otherwise grossly unremarkable. Multilevel disc desiccation, most pronounced at L2-3 and L5-S1. Normal appearance of the distal spinal cord with the conus terminating at the L1 inferior endplate level. The visualized paraspinous   soft tissues and bony pelvis appear unremarkable.     Segmental analysis:  T12-L1: Normal disc height. No herniation. Normal facets. No spinal canal or neural foraminal stenosis.     L1-L2: Normal disc height. No herniation. Normal facets. No spinal canal or neural foraminal stenosis     L2-L3: Normal disc height. No herniation. Normal facets. No spinal canal or neural foraminal stenosis.     L3-L4: Normal disc height. No herniation. Normal facets. No spinal canal or neural foraminal stenosis.     L4-L5: Normal disc height. No herniation. Normal facets. No spinal canal stenosis. Minimal left neural foraminal narrowing. The right neural foramen appears patent.     L5-S1: Mild to moderate disc height loss. Disc bulge eccentric to the left with superimposed left central disc protrusion (series 104 image 10, series 109 image 47). Mild facet arthropathy. There is asymmetric mass effect upon and posterior displacement   of the traversing left S1 nerve root by the disc protrusion. There is  up to moderate left lateral recess narrowing and mild overall central spinal canal narrowing. There is moderate left and mild to moderate right neural foraminal stenosis.     S1-S2: No spinal canal or neural foraminal stenosis evident.                                                                      IMPRESSION:  1.  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  2.  At L5-S1, there is a disc bulge eccentric to the left with superimposed left central disc protrusion. This results in asymmetric mass effect upon and posterior displacement of the traversing left S1 nerve root. Correlate clinically for possible left   S1 radiculopathy symptoms. There is up to moderate left lateral recess stenosis and mild overall central spinal canal stenosis at that level. Moderate left and mild to moderate right L5-S1 neural foraminal stenosis.  3.  No significant spinal canal or neural foraminal stenosis elsewhere.     A/P: lumbar radiculopathy  Reviewed lumbar MRI in clinic. Will proceed with LESI at this time. Ok to continue home therapy exercises. She verbalized understanding and agreement.  Patient Instructions   -Lumbar injection ordered. They will contact you to schedule.  -Please contact our clinic with questions or concerns at [phone].    Radha Vu CNP  [facility] Neurosurgery  [address] [place] [address]  Tel [phone]  Fax [phone]      Again, thank you for allowing me to participate in the care of your patient.        Sincerely,        Radha Vu NP    Electronically signed

## 2025-05-02 ENCOUNTER — MYC MEDICAL ADVICE (OUTPATIENT)
Dept: NEUROSURGERY | Facility: CLINIC | Age: 45
End: 2025-05-02
Payer: OTHER MISCELLANEOUS

## 2025-05-05 ENCOUNTER — TELEPHONE (OUTPATIENT)
Dept: PALLIATIVE MEDICINE | Facility: CLINIC | Age: 45
End: 2025-05-05
Payer: OTHER MISCELLANEOUS

## 2025-05-05 NOTE — TELEPHONE ENCOUNTER
"Screening Questions for Radiology Injections:    Injection to be done at which interventional clinic site? [facility]    If choosing [facility] for location, please inform patient:  \"[facility] is a Hospital based clinic. Before your visit, you should check with your insurance about how it covers the charges for facility services in a hospital-based clinic.     Procedure ordered by Mirella    Procedure ordered? L5-S1 GERI   Transforaminal Cervical GERI - Send to [facility] - No [facility] providers perform this procedure    What insurance would patient like us to bill for this procedure? WC With Kaitlin/ adj is Ayden Carolina [phone] [id number]/ BA   IF SCHEDULING IN [facility] PAIN OR SPINE PLEASE SCHEDULE AT LEAST 7-10 BUSINESS DAYS OUT SO A PA CAN BE OBTAINED  Worker's comp or MVA (motor vehicle accident) -Any injection DO NOT SCHEDULE and route to Dalia Saavedra    [email or web address] insurance - For ALL INJECTIONS DO NOT SCHEDULE and route to Harmony Moyer.     ALL BCBS, [employer] and [employer] CIGNA - DO NOT SCHEDULE and route to Harmony Giangy  MEDICA- ALL INJECTIONS- route to Harmony Moyer    Is patient scheduled at [facility]? no   If YES, route every encounter to [facility] SPINE CENTER CARE NAVIGATION POOL [[phone]]    Is an  needed? No     Patient has a  home? (Review Grid) YES: ok    Any chance of pregnancy? NO   If YES, do NOT schedule and route to RN pool  - Dr. Card route to PM&R Nurse  [[id number]]      Is patient actively being treated for cancer or immunocompromised? No  If YES, do NOT schedule and route to RN pool/ Dr. Card's Team    Does the patient have a bleeding or clotting disorder? No   If YES, okay to schedule AND route to RN nurse / Dr. Card's Team   (For any patients with platelet count <100, RN must forward to provider)    Is patient taking any Blood Thinners OR Antiplatelet medication?  No   If hold needed, do NOT schedule, " route to ISAIAH citnron/ Dr. Card's Team  Examples:   Blood Thinners: (Coumadin, Warfarin, Jantoven, Pradaxa, Xarelto, Eliquis, Edoxaban, Enoxaparin, Lovenox, Heparin, Arixtra, Fondaparinux or Fragmin)  Antiplatelet Medications: (Plavix, Brilinta or Effient)     Is patient taking any aspirin products (includes Excedrin and Fiorinal)? No.    If yes route to RN pool/ Dr. Card's Team - Do not schedule    Is patient taking any GLP-1 Antagonist (hold needed for sedation patients only) No   (semaglutide (Ozempic, Wegovy), dulaglutide (Trulicity), exenatide ER (Bydureon), tirzepatide (Mounjaro), Liraglutide (Saxenda, Victoza), semaglutide (Rybelsus), Terzepatide (Zepbound)  If YES, okay to schedule AND route to RN nurse / Dr. Card's Team      Any allergies to contrast dye, iodine, shellfish, or numbing and steroid medications? No  If YES, schedule and add allergy information to appointment notes AND route to the RN pool/ Dr. Card's Team  If GERI and Contrast Dye / Iodine Allergy? DO NOT SCHEDULE, route to RN pool/ Dr. Card's Team  Allergies: Oxycodone and Gentamycin [gentamicin sulfate]     Does patient have an active infection or treated for one within the past week? No  Is patient currently taking any antibiotics or steroid medications?  No   For patients on chronic, preventative, or prophylactic antibiotics, procedures may be scheduled.   For patients on antibiotics for active or recent infection, schedule 4 days after completed.  For patients on steroid medications, schedule 4 days after completed.     Has the patient had a flu shot or any other vaccinations within the past 7 days? No  If yes, explain that for the vaccine to work best they need to:     wait 1 week before and 1 week after getting any Vaccine  wait 1 week before and 2 weeks after getting any Covid Vaccine   If patient has concerns about the timing, send to RN pool/ Dr. Card's Team    Does patient have an MRI/CT?  YES: mri Include Date and Check  Procedure Scheduling Grid to see if required.  Was the MRI/CT done within the last 3 years?  Yes   If no route to RN Pool/ Dr. Card's Team  If yes, where was the MRI/CT done? [facility]   Refer to PACS Transmissions list for approved external locations and route to RN Pool High Priority/ Dr. Arredondos Team  If MRI was not done at approved external location do NOT schedule and route to RN pool/ Dr. Arredondos Team    If patient has an imaging disc, the injection MAY be scheduled but patient must bring disc to appt or appt will be cancelled.    Is patient able to transfer to a procedure table with minimal or no assistance? Yes   If no, do NOT schedule and route to RN Pool/ Dr. Card's Team    Procedure Specific Instructions:  If celiac plexus block, informed patient NPO for 6 hours and that it is okay to take medications with sips of water, especially blood pressure medications Not Applicable       If this is for a cervical procedure, informed patient that aspirin needs to be held for 6 days.   Not Applicable    Sedation, If Sedation is ordered for any procedure, patient must be NPO for 6 hours prior to procedure Not Applicable    If IV needed:  Do not schedule procedures requiring IV placement in the first appointment of the day or first appointment after lunch. Do NOT schedule at 0745, 0815 or 1245.   Instructed patient to arrive 30 minutes early for IV start if required. (Check Procedure Scheduling Grid)  Not Applicable    Reminders:  If you are started on any steroids or antibiotics between now and your appointment, you must contact us because the procedure may need to be cancelled.  Yes    As a reminder, receiving steroids can decrease your body's ability to fight infection.   Would you still like to move forward with scheduling the injection?  Yes    IV Sedation is not provided for procedures. If oral anti-anxiety medication is needed, the patient should request this from their referring provider.    Instruct  patient to arrive as directed prior to the scheduled appointment time:  If IV needed 30 minutes before appointment time     For patients 85 or older we recommend having an adult stay w/ them for the remainder of the day.     If the patient is Diabetic, remind them to bring their glucometer.    Dr. Ho Pt's - Imaging Orders Needed   Please send all injections to RN Pool NO   Red Flags? NO    Does the patient have any questions?  NO  Krystle Acosta  [facility]

## 2025-05-05 NOTE — CONFIDENTIAL NOTE
OK per Radha Vu NP to write work letter reducing hours from 8 to 4-6 hour shifts until injection. Updated patient via [employer] and inquired about injection date. Pending reply from patient.

## 2025-05-06 NOTE — CONFIDENTIAL NOTE
Patient said its going through WC and does not have injection scheduled yet. She provided writer with Work Comp  and info.     Pierre with Work comp  8   Ext [phone]   [employer] claims      Attempted to reach out to Pierre with , no answer. Left voice message asking for a call back. Need to make sure he has injection order and check on  auth status. Patient needs  approval before she can schedule injection.     Radha Vu NP approved for work letter reducing hours to 4-6 hr shifts until her injection appt.     Will need work restrictions letter drafted when we know injection date.

## 2025-05-13 ENCOUNTER — TELEPHONE (OUTPATIENT)
Dept: NEUROSURGERY | Facility: CLINIC | Age: 45
End: 2025-05-13
Payer: OTHER MISCELLANEOUS

## 2025-05-13 NOTE — TELEPHONE ENCOUNTER
[facility] Call Center    Phone Message    May a detailed message be left on voicemail: yes     Reason for Call: Other: Patient is calling stating that she needs her MRI report faxed to her WC manager fax # [phone]. Please fax info and call back with updates.     Action Taken: Message routed to:  Other:  Neurosurgery    Travel Screening: Not Applicable

## 2025-05-13 NOTE — TELEPHONE ENCOUNTER
Reason for call:  Other   Patient called regarding (reason for call): appointment and call back  Additional comments: Pt called back with additional information for her WC and would like to get the needed information faxed to the  as soon as possible so they can approve it. Please call the pt back with any updates or questions for this request.    WC With Kaitlin   is Ayden Figueroa   Ph: [phone] [id number]/ BA   Fax: [phone]    Phone number to reach patient:  Home number on file [phone] (home)    Best Time:  Any    Can we leave a detailed message on this number?  YES    Brittnee Michaels      [facility]  Pain Management

## 2025-05-20 NOTE — TELEPHONE ENCOUNTER
Faxed referral and supporting documentation for  L5-S1 GERI  to W/C .            Dalia Acosta  Complex   [facility]  Pain Management

## 2025-06-05 ENCOUNTER — MYC MEDICAL ADVICE (OUTPATIENT)
Dept: NEUROSURGERY | Facility: CLINIC | Age: 45
End: 2025-06-05
Payer: OTHER MISCELLANEOUS

## 2025-06-05 NOTE — LETTER
06/05/25     To Whom it May Concern,      Sarah Hooker  is being seen at our clinic for spinal care. She is able to continue working with the restrictions of:    -Reduced hours per shift  -Max of 4-6 hours per shift until injection on 6/12/25    Sarah Hooker will be re-evaluated after completing spinal injection on 6/12/25. Please call our clinic with questions or concerns: [phone]       Sincerely,    Radha Vu, NP

## 2025-06-12 ENCOUNTER — RADIOLOGY INJECTION OFFICE VISIT (OUTPATIENT)
Dept: PALLIATIVE MEDICINE | Facility: CLINIC | Age: 45
End: 2025-06-12
Attending: NURSE PRACTITIONER
Payer: OTHER MISCELLANEOUS

## 2025-06-12 VITALS — SYSTOLIC BLOOD PRESSURE: 150 MMHG | OXYGEN SATURATION: 99 % | DIASTOLIC BLOOD PRESSURE: 92 MMHG | HEART RATE: 80 BPM

## 2025-06-12 DIAGNOSIS — M54.16 LUMBAR RADICULOPATHY: ICD-10-CM

## 2025-06-12 RX ORDER — DEXAMETHASONE SODIUM PHOSPHATE 10 MG/ML
10 INJECTION, SOLUTION INTRAMUSCULAR; INTRAVENOUS ONCE
Status: COMPLETED | OUTPATIENT
Start: 2025-06-12 | End: 2025-06-12

## 2025-06-12 RX ADMIN — DEXAMETHASONE SODIUM PHOSPHATE 10 MG: 10 INJECTION, SOLUTION INTRAMUSCULAR; INTRAVENOUS at 15:48

## 2025-06-12 ASSESSMENT — PAIN SCALES - GENERAL
PAINLEVEL_OUTOF10: MILD PAIN (2)
PAINLEVEL_OUTOF10: SEVERE PAIN (7)

## 2025-06-12 NOTE — PATIENT INSTRUCTIONS
[facility]   Procedure Discharge Instructions    Today you saw:    Dr. Kurtis Kellogg,         You had a(n):  Lumbar epidural steroid injection Transforaminal      Medications used for lumbar TFESI: Lidocaine, Omnipaque, Dexamethasone, Bupivicaine, normal saline          Be cautious with walking. Numbness and/or weakness in the lower extremities may occur for up to 6-8 hours after the procedure due to effect of the local anesthetic  Do not drive for 6 hours. The effect of the local anesthetic could slow your reflexes.   You may resume your regular activities after 24 hours  Avoid strenuous activity for the first 24 hours  You may shower, however avoid swimming, tub baths or hot tubs for 24 hours following your procedure  You may have a mild to moderate increase in pain for several days following the injection.  It may take up to 14 days for the steroid medication to start working although you may feel the effect as early as a few days after the procedure.     You may use ice packs for 10-15 minutes, 3 to 4 times a day at the injection site for comfort  Do not use heat to painful areas for 6 to 8 hours. This will give the local anesthetic time to wear off and prevent you from accidentally burning your skin.   Unless you have been directed to avoid the use of anti-inflammatory medications (NSAIDS), you may use medications such as ibuprofen, Aleve or Tylenol for pain control if needed.   If you have diabetes, check your blood sugar more frequently than usual as your blood sugar may be higher than normal for 10-14 days following a steroid injection. Contact your doctor who manages your diabetes if your blood sugar is higher than usual  Possible side effects of steroids that you may experience include flushing, elevated blood pressure, increased appetite, mild headaches and restlessness.  All of these symptoms will get better with time.  If you experience any of the following, call the  Pain Clinic during work hours (Mon-Friday 8-4:30 pm) at [phone] or the Provider Line after hours at [phone]:  -Fever over 100 degree F  -Swelling, bleeding, redness, drainage, warmth at the injection site  -Progressive weakness or numbness in your legs  -Loss of bowel or bladder function  -Unusual headache not relieved by Tylenol or other pain reliever  -Unusual new onset of pain that is not improving

## 2025-06-12 NOTE — NURSING NOTE
Discharge Information    IV Discontiued Time:  NA    Amount of Fluid Infused:  NA    Discharge Criteria = When patient returns to baseline or as per MD order    Consciousness:  Pt is fully awake    Circulation:  BP +/- 20% of pre-procedure level    Respiration:  Patient is able to breathe deeply    O2 Sat:  Patient is able to maintain O2 Sat >92% on room air    Activity:  Moves 4 extremities on command    Ambulation:  Patient is able to stand and walk or stand and pivot into wheelchair    Dressing:  Clean/dry or No Dressing    Notes:   Discharge instructions and AVS given to patient    Patient meets criteria for discharge?  YES    Admitted to PCU?  No    Responsible adult present to accompany patient home?  Yes    Signature/Title:    Azul Estes RN  RN Care Coordinator  [facility] [place]

## 2025-06-13 NOTE — PROGRESS NOTES
Pre procedure Diagnosis: lumbar radiculopathy, lumbar degenerative disc disease   Post procedure Diagnosis: Same  Procedure performed: lumbar transforaminal epidural steroid injection at bilateral L5, fluoroscopically guided, contrast controlled  Anesthesia: none  Complications: none  Operators: Kurtis Kellogg MD     Indications:   Sarah Hooker is a 44 year old female.  They have a history of lbp rad to her le.  Exam shows +slump and they have tried conservative treatment including meds/pt.    MRI rev  Options/alternatives, benefits and risks were discussed with the patient including bleeding, infection, tissue trauma, numbness, weakness, paralysis, spinal cord injury, radiation exposure, headache and reaction to medications. Questions were answered to her satisfaction and she agrees to proceed. Voluntary informed consent was obtained and signed.     Vitals were reviewed: Yes  BP (!) 150/92   Pulse 80   LMP 10/17/2011   SpO2 99%   Allergies were reviewed:  Yes   Medications were reviewed:  Yes   Pre-procedure pain score: 7/10    Procedure:  After getting informed consent, patient was brought into the procedure suite and was placed in a prone position on the procedure table.   A Pause for the Cause was performed.  Patient was prepped and draped in sterile fashion.     After identifying the bilateral L5 neuroforamen, the C-arm was rotated to a bilateral lateral oblique angle.  A total of 4ml of Lidocaine 1% was used to anesthetize the skin and the needle track at a skin entry site coaxial with the fluoroscopy beam, and overriding the superior aspect of the neuroforamen.  A 22 gauge 3.5 inch spinal needle was advanced under intermittent fluoroscopy until it entered the foramen superiorly.    The position was then inspected from anteroposterior and lateral views, and the needle adjusted appropriately.  A total of 1ml of Omnipaque-300 was injected, confirming appropriate position, with spread into the nerve root  sheath and the epidural space, with no intravascular uptake. 9ml was wasted    Then, after repeated negative aspiration, a combination of Decadron 10 mg, 0.25% bupivacaine 2 ml, diluted with 3ml of normal saline was injected.     Hemostasis was achieved, the area was cleaned, and bandaids were placed when appropriate.  The patient tolerated the procedure well, and was taken to the recovery room.    Images were saved to PACS.    Post-procedure pain score: 2/10  Follow-up includes:   -f/u with referring provider    Kurtis Kellogg MD  [facility] [place]

## 2025-09-04 ENCOUNTER — OFFICE VISIT (OUTPATIENT)
Dept: NEUROSURGERY | Facility: CLINIC | Age: 45
End: 2025-09-04
Payer: OTHER MISCELLANEOUS

## 2025-09-04 VITALS
WEIGHT: 209 LBS | TEMPERATURE: 98.3 F | OXYGEN SATURATION: 99 % | SYSTOLIC BLOOD PRESSURE: 124 MMHG | DIASTOLIC BLOOD PRESSURE: 86 MMHG | HEART RATE: 89 BPM | HEIGHT: 66 IN | BODY MASS INDEX: 33.59 KG/M2

## 2025-09-04 DIAGNOSIS — M54.16 LUMBAR RADICULOPATHY: Primary | ICD-10-CM

## 2025-09-04 ASSESSMENT — PAIN SCALES - GENERAL: PAINLEVEL_OUTOF10: SEVERE PAIN (8)

## 2025-09-11 ENCOUNTER — PRE VISIT (OUTPATIENT)
Dept: NEUROSURGERY | Facility: CLINIC | Age: 45
End: 2025-09-11
Payer: OTHER MISCELLANEOUS